# Patient Record
Sex: MALE | Race: WHITE
[De-identification: names, ages, dates, MRNs, and addresses within clinical notes are randomized per-mention and may not be internally consistent; named-entity substitution may affect disease eponyms.]

---

## 2018-02-12 ENCOUNTER — HOSPITAL ENCOUNTER (EMERGENCY)
Dept: HOSPITAL 45 - C.EDB | Age: 23
Discharge: HOME | End: 2018-02-12
Payer: COMMERCIAL

## 2018-02-12 VITALS
SYSTOLIC BLOOD PRESSURE: 138 MMHG | OXYGEN SATURATION: 100 % | DIASTOLIC BLOOD PRESSURE: 83 MMHG | TEMPERATURE: 98.24 F | HEART RATE: 75 BPM

## 2018-02-12 VITALS
HEIGHT: 67.01 IN | HEIGHT: 67.01 IN | BODY MASS INDEX: 28.17 KG/M2 | BODY MASS INDEX: 28.17 KG/M2 | WEIGHT: 179.46 LBS | WEIGHT: 179.46 LBS

## 2018-02-12 DIAGNOSIS — K08.89: Primary | ICD-10-CM

## 2018-02-12 DIAGNOSIS — K02.9: ICD-10-CM

## 2018-02-12 NOTE — EMERGENCY ROOM VISIT NOTE
History


First contact with patient:  02:08


Chief Complaint:  DENTAL PAIN


Stated Complaint:  EXTREME TOOTH PAIN,NAUSEA,DIARRHEA


Nursing Triage Summary:  


C/o top right dental pain. Appointment for dentist later in week.





History of Present Illness


The patient is a 23 year old male who presents to the Emergency Room with 

complaints of worsening right upper dental pain for the past few days he saw 

the dentist the other day and had a temporary filling placed.  He was given T 

threes.  No antibiotics.  Pain described as throbbing, range in severity 8 out 

of 10.  Nothing makes it better or worse.  It does not radiate.  Patient denies 

fever, chills, chest pain, dyspnea, cold symptoms, sore throat, earache.





Review of Systems


An 10 system review of systems was completed with positives and pertinent 

negatives listed in the HPI.





Past Medical/Surgical History


None





Social History


Smoking Status:  Never Smoker


Alcohol Use:  none


Marital Status:  in relationship





Current/Historical Medications


Scheduled


Clindamycin Hcl (Cleocin), 150 MG PO QID





Scheduled PRN


Tramadol (Ultram), 1 TAB PO Q4H PRN for Pain





Miscellaneous Medications


None (Patient States No Home Meds)





Physical Exam


Vital Signs











  Date Time  Temp Pulse Resp B/P (MAP) Pulse Ox O2 Delivery O2 Flow Rate FiO2


 


2/12/18 02:33 36.8 75 18 138/83 100   


 


2/12/18 02:03 36.8 75 18 138/83 100 Room Air  











Physical Exam


VITALS: Vitals are noted on the nurse's note and reviewed by myself.  Vital 

signs stable.


GENERAL: White male, in no acute distress, nondiaphoretic, well-developed well-

nourished.


SKIN: The skin was without rashes, erythema, edema, or bruising.  There is no 

tenting of the skin.  Capillary reflex less than 2 seconds.


HEAD: Normocephalic atraumatic.  


EARS: External auditory canals clear, tympanic membranes pearly gray without 

erythema or effusion bilaterally.


EYES: Pupils equal round and reactive to light and accommodation.  Conjunctivae 

without injection, sclerae without icterus.  Extraocular movements intact.  


NOSE: Patent, turbinates without inflammation or discharge.  No sinus 

tenderness.


MOUTH: Mucous membranes moist.    Pharynx without erythema or exudate.  Uvula 

midline.  Airway patent.  Tongue does not deviate.  


Dental exam: Extensive dental decay throughout the mouth without palpable 

abscess.  No Kraig's angina


NECK: Supple without nuchal rigidity.  No lymphadenopathy.  No thyromegaly.  

Cervical spine is nontender.  No JVD.


HEART: Regular rate and rhythm without murmurs gallops or rubs.


LUNGS: Clear to auscultation bilaterally without wheezes, rales or rhonchi.  No 

dullness to percussion.  No retractions or accessory muscle use.


ABDOMEN: Positive bowel sounds x 4.  Normal tympanic percussion.  Soft, 

nontender, without masses or organomegaly.  Zelaya sign negative.  No guarding 

or rebound tenderness.


MUSCULOSKELETAL: No muscle atrophy, erythema, or edema noted.   


NEURO: Patient was alert and oriented to person place and time.     No focal 

neurological deficits.





Medical Decision & Procedures


Medications Administered











 Medications


  (Trade)  Dose


 Ordered  Sig/Fely


 Route  Start Time


 Stop Time Status Last Admin


Dose Admin


 


 Clindamycin HCl


  (Cleocin Cap)  150 mg  NOW  ONCE


 PO  2/12/18 02:15


 2/12/18 02:16 DC 2/12/18 02:27


150 MG


 


 Tramadol HCl


  (Ultram Home


 Pack)  1 homepack  UD  ONCE


 PO  2/12/18 02:15


 2/12/18 02:16 DC 2/12/18 02:27


1 HOMEPACK











ED Course


Prior records reviewed and summarized as above.


Triage Nursing notes reviewed.





The patient's history was concerning for dental pain





Differential diagnosis:


Etiologies such as cellulitis, abscess, gingivitis, cavity, Kraig's angina, as 

well as others were entertained..





Physical examination:


The physical examination was consistent with dental caries 





ER treatment provided:


Clindamycin, Ultram


On reassessment the patient felt better.





Diagnostics interpreted by me:


Deferred





This appears to be dental pain from dental caries.  Patient had no palpable 

abscess.  He is counseled on proper dental hygiene.  He stated that the T3's 

did not help.  He is informed to turn these into the pharmacy then.  He was 

advised to see his dentist as soon as possible for his ongoing dental issues or 

here in the ER sooner for fevers, facial swelling, neck stiffness, worsening 

signs or symptoms or as needed.





By the evaluation outlined above emergent etiologies such as abscess, Kraig's 

angina, as well as others were deemed relatively unlikely.





The pt informed about the findings as listed above. All questions were answered 

and  pleased with the treatment. Return instructions were outlined and the 

patient was discharged in stable condition.





Outpatient prescription management:


Clindamycin, Ultram





Referral:


The patient was referred back to dentistry for follow-up in 2 to 3 days for a 

recheck of the current condition.





Medical Decision


As above





[place] Drug Monitoring Program


Search Results:  patient reviewed within database, see additional documentation 

(Patient had 40 T3 prescribed by the dentist)





Medication Reconcilliation


Current Medication List:  was personally reviewed by me





Blood Pressure Screening


Patient's blood pressure:  Normal blood pressure





Impression





 Primary Impression:  


 Tooth pain with chewing


 Additional Impression:  


 Dental caries





Departure Information


Dispostion


Home / Self-Care





Condition


GOOD





Prescriptions





Tramadol (Ultram) 50 Mg Tab


1 TAB PO Q4H Y for Pain, #14 TAB


   For Initial Treatment


   Prov: Luci Devi .NOEMI         2/12/18 


Clindamycin Hcl (CLEOCIN) 150 Mg Cap


150 MG PO QID for 10 Days, #40 CAP


   Prov: Luci Devi .NOEMI         2/12/18





Referrals


No Doctor, Assigned (PCP)





Forms


HOME CARE DOCUMENTATION FORM,                                                 

               IMPORTANT VISIT INFORMATION





Patient Instructions


[facility], [facility], ED Tooth Pain





Additional Instructions








Clindamycin 150mg:  Take one pill 4 times daily for 10 days for your infection.

  Take with food, but avoid dairy.  Avoid prolonged sun exposure since this 

medication makes you temporarily more susceptible to sunburns.  All antibiotics 

can cause diarrhea.  If this occurs and you feel worse or it does not resolve 

in 1-2 days follow up with your doctor or return to the Emergency Department as 

this could be signs of serious underlying problems.  Any medication can cause 

an allergic reaction, stop the pills immediately and return to the ER for rash, 

hives, breathing difficulties, or swelling.








Ulram 50mg: Take 1-2 pills every four hours for breakthrough pain.  Avoid 

alcohol, operating machinery or dangerous equipment, working on ladders or roofs

, DRIVING, or situations where being under the influence may be dangerous.  It 

is recommended to use an over-the-counter stool softener such as Colace, 100mg 

twice daily while taking this medication to avoid constipation. 





Ibuprofen(Motrin, Advil) may be used for fever or pain.  Use 600mg every six 

hours as needed.  Take with food.  Avoid using more than 2400mg in a 24 hour 

period.  Do not use 2400mg per day for more than three consecutive days without 

physician direction.  Prolonged inappropriate use can lead to stomach upset or 

ulcers.  This medication can be taken if you need to drive, work, or perform 

activities which may be dangerous when taking narcotic pain medication.


(AND/OR)


Acetaminophen(Tylenol) may be used for fever or pain.  Use 1000mg every six 

hours as needed.  Avoid using more than 3000mg in a 24 hour period.  This 

medication can be taken if you need to drive, work, or perform activities which 

may be dangerous when taking narcotic pain medication.





Brush teeth twice a day, floss daily and do warm saltwater gargles 3 times a 

day.





See a dentist as soon as possible for definitive care for your dental problem.





Return to ER sooner for facial swelling, fever, redness, worsening signs or 

symptoms or as needed.





Problem Qualifiers